# Patient Record
Sex: FEMALE | Race: WHITE | NOT HISPANIC OR LATINO | URBAN - METROPOLITAN AREA
[De-identification: names, ages, dates, MRNs, and addresses within clinical notes are randomized per-mention and may not be internally consistent; named-entity substitution may affect disease eponyms.]

---

## 2023-11-28 ENCOUNTER — EMERGENCY (EMERGENCY)
Facility: HOSPITAL | Age: 28
LOS: 1 days | Discharge: HOME | End: 2023-11-28
Attending: STUDENT IN AN ORGANIZED HEALTH CARE EDUCATION/TRAINING PROGRAM | Admitting: STUDENT IN AN ORGANIZED HEALTH CARE EDUCATION/TRAINING PROGRAM
Payer: COMMERCIAL

## 2023-11-28 ENCOUNTER — EMERGENCY (EMERGENCY)
Facility: HOSPITAL | Age: 28
LOS: 1 days | Discharge: ANOTHER HOSPITAL | End: 2023-11-28
Attending: EMERGENCY MEDICINE | Admitting: EMERGENCY MEDICINE
Payer: COMMERCIAL

## 2023-11-28 VITALS
RESPIRATION RATE: 18 BRPM | HEART RATE: 102 BPM | SYSTOLIC BLOOD PRESSURE: 129 MMHG | DIASTOLIC BLOOD PRESSURE: 74 MMHG | HEIGHT: 64 IN | WEIGHT: 134.92 LBS | OXYGEN SATURATION: 97 % | TEMPERATURE: 100 F

## 2023-11-28 VITALS
DIASTOLIC BLOOD PRESSURE: 75 MMHG | TEMPERATURE: 98 F | HEART RATE: 76 BPM | OXYGEN SATURATION: 99 % | SYSTOLIC BLOOD PRESSURE: 109 MMHG | HEIGHT: 64 IN | WEIGHT: 134.92 LBS | RESPIRATION RATE: 15 BRPM

## 2023-11-28 VITALS
DIASTOLIC BLOOD PRESSURE: 78 MMHG | OXYGEN SATURATION: 100 % | SYSTOLIC BLOOD PRESSURE: 119 MMHG | HEART RATE: 77 BPM | TEMPERATURE: 99 F | RESPIRATION RATE: 16 BRPM

## 2023-11-28 VITALS
RESPIRATION RATE: 16 BRPM | HEART RATE: 108 BPM | OXYGEN SATURATION: 97 % | TEMPERATURE: 98 F | SYSTOLIC BLOOD PRESSURE: 140 MMHG | DIASTOLIC BLOOD PRESSURE: 78 MMHG

## 2023-11-28 LAB
ALBUMIN SERPL ELPH-MCNC: 4.2 G/DL — SIGNIFICANT CHANGE UP (ref 3.4–5)
ALBUMIN SERPL ELPH-MCNC: 4.2 G/DL — SIGNIFICANT CHANGE UP (ref 3.4–5)
ALP SERPL-CCNC: 53 U/L — SIGNIFICANT CHANGE UP (ref 40–120)
ALP SERPL-CCNC: 53 U/L — SIGNIFICANT CHANGE UP (ref 40–120)
ALT FLD-CCNC: 14 U/L — SIGNIFICANT CHANGE UP (ref 12–42)
ALT FLD-CCNC: 14 U/L — SIGNIFICANT CHANGE UP (ref 12–42)
ANION GAP SERPL CALC-SCNC: 10 MMOL/L — SIGNIFICANT CHANGE UP (ref 9–16)
ANION GAP SERPL CALC-SCNC: 10 MMOL/L — SIGNIFICANT CHANGE UP (ref 9–16)
APPEARANCE UR: CLEAR — SIGNIFICANT CHANGE UP
APPEARANCE UR: CLEAR — SIGNIFICANT CHANGE UP
APTT BLD: 29.1 SEC — SIGNIFICANT CHANGE UP (ref 24.5–35.6)
APTT BLD: 29.1 SEC — SIGNIFICANT CHANGE UP (ref 24.5–35.6)
AST SERPL-CCNC: 18 U/L — SIGNIFICANT CHANGE UP (ref 15–37)
AST SERPL-CCNC: 18 U/L — SIGNIFICANT CHANGE UP (ref 15–37)
BACTERIA # UR AUTO: PRESENT /HPF — SIGNIFICANT CHANGE UP
BACTERIA # UR AUTO: PRESENT /HPF — SIGNIFICANT CHANGE UP
BASOPHILS # BLD AUTO: 0.04 K/UL — SIGNIFICANT CHANGE UP (ref 0–0.2)
BASOPHILS # BLD AUTO: 0.04 K/UL — SIGNIFICANT CHANGE UP (ref 0–0.2)
BASOPHILS NFR BLD AUTO: 0.3 % — SIGNIFICANT CHANGE UP (ref 0–2)
BASOPHILS NFR BLD AUTO: 0.3 % — SIGNIFICANT CHANGE UP (ref 0–2)
BILIRUB SERPL-MCNC: 0.4 MG/DL — SIGNIFICANT CHANGE UP (ref 0.2–1.2)
BILIRUB SERPL-MCNC: 0.4 MG/DL — SIGNIFICANT CHANGE UP (ref 0.2–1.2)
BILIRUB UR-MCNC: NEGATIVE — SIGNIFICANT CHANGE UP
BILIRUB UR-MCNC: NEGATIVE — SIGNIFICANT CHANGE UP
BLD GP AB SCN SERPL QL: NEGATIVE — SIGNIFICANT CHANGE UP
BLD GP AB SCN SERPL QL: NEGATIVE — SIGNIFICANT CHANGE UP
BUN SERPL-MCNC: 11 MG/DL — SIGNIFICANT CHANGE UP (ref 7–23)
BUN SERPL-MCNC: 11 MG/DL — SIGNIFICANT CHANGE UP (ref 7–23)
CALCIUM SERPL-MCNC: 9.5 MG/DL — SIGNIFICANT CHANGE UP (ref 8.5–10.5)
CALCIUM SERPL-MCNC: 9.5 MG/DL — SIGNIFICANT CHANGE UP (ref 8.5–10.5)
CHLORIDE SERPL-SCNC: 100 MMOL/L — SIGNIFICANT CHANGE UP (ref 96–108)
CHLORIDE SERPL-SCNC: 100 MMOL/L — SIGNIFICANT CHANGE UP (ref 96–108)
CO2 SERPL-SCNC: 24 MMOL/L — SIGNIFICANT CHANGE UP (ref 22–31)
CO2 SERPL-SCNC: 24 MMOL/L — SIGNIFICANT CHANGE UP (ref 22–31)
COLOR SPEC: YELLOW — SIGNIFICANT CHANGE UP
COLOR SPEC: YELLOW — SIGNIFICANT CHANGE UP
COMMENT - URINE: SIGNIFICANT CHANGE UP
COMMENT - URINE: SIGNIFICANT CHANGE UP
CREAT SERPL-MCNC: 0.69 MG/DL — SIGNIFICANT CHANGE UP (ref 0.5–1.3)
CREAT SERPL-MCNC: 0.69 MG/DL — SIGNIFICANT CHANGE UP (ref 0.5–1.3)
CRP SERPL-MCNC: <3 MG/L — SIGNIFICANT CHANGE UP (ref 0–4)
CRP SERPL-MCNC: <3 MG/L — SIGNIFICANT CHANGE UP (ref 0–4)
DIFF PNL FLD: NEGATIVE — SIGNIFICANT CHANGE UP
DIFF PNL FLD: NEGATIVE — SIGNIFICANT CHANGE UP
EGFR: 122 ML/MIN/1.73M2 — SIGNIFICANT CHANGE UP
EGFR: 122 ML/MIN/1.73M2 — SIGNIFICANT CHANGE UP
EOSINOPHIL # BLD AUTO: 0 K/UL — SIGNIFICANT CHANGE UP (ref 0–0.5)
EOSINOPHIL # BLD AUTO: 0 K/UL — SIGNIFICANT CHANGE UP (ref 0–0.5)
EOSINOPHIL NFR BLD AUTO: 0 % — SIGNIFICANT CHANGE UP (ref 0–6)
EOSINOPHIL NFR BLD AUTO: 0 % — SIGNIFICANT CHANGE UP (ref 0–6)
EPI CELLS # UR: PRESENT
EPI CELLS # UR: PRESENT
ERYTHROCYTE [SEDIMENTATION RATE] IN BLOOD: 8 MM/HR — SIGNIFICANT CHANGE UP
ERYTHROCYTE [SEDIMENTATION RATE] IN BLOOD: 8 MM/HR — SIGNIFICANT CHANGE UP
GLUCOSE SERPL-MCNC: 113 MG/DL — HIGH (ref 70–99)
GLUCOSE SERPL-MCNC: 113 MG/DL — HIGH (ref 70–99)
GLUCOSE UR QL: NEGATIVE MG/DL — SIGNIFICANT CHANGE UP
GLUCOSE UR QL: NEGATIVE MG/DL — SIGNIFICANT CHANGE UP
HCG SERPL-ACNC: <1 MIU/ML — SIGNIFICANT CHANGE UP
HCG SERPL-ACNC: <1 MIU/ML — SIGNIFICANT CHANGE UP
HCT VFR BLD CALC: 37.1 % — SIGNIFICANT CHANGE UP (ref 34.5–45)
HCT VFR BLD CALC: 37.1 % — SIGNIFICANT CHANGE UP (ref 34.5–45)
HGB BLD-MCNC: 12.8 G/DL — SIGNIFICANT CHANGE UP (ref 11.5–15.5)
HGB BLD-MCNC: 12.8 G/DL — SIGNIFICANT CHANGE UP (ref 11.5–15.5)
IMM GRANULOCYTES NFR BLD AUTO: 0.2 % — SIGNIFICANT CHANGE UP (ref 0–0.9)
IMM GRANULOCYTES NFR BLD AUTO: 0.2 % — SIGNIFICANT CHANGE UP (ref 0–0.9)
INR BLD: 1.13 — SIGNIFICANT CHANGE UP (ref 0.85–1.18)
INR BLD: 1.13 — SIGNIFICANT CHANGE UP (ref 0.85–1.18)
KETONES UR-MCNC: NEGATIVE MG/DL — SIGNIFICANT CHANGE UP
KETONES UR-MCNC: NEGATIVE MG/DL — SIGNIFICANT CHANGE UP
LACTATE BLDV-MCNC: 2.1 MMOL/L — HIGH (ref 0.5–2)
LACTATE BLDV-MCNC: 2.1 MMOL/L — HIGH (ref 0.5–2)
LEUKOCYTE ESTERASE UR-ACNC: ABNORMAL
LEUKOCYTE ESTERASE UR-ACNC: ABNORMAL
LYMPHOCYTES # BLD AUTO: 1.56 K/UL — SIGNIFICANT CHANGE UP (ref 1–3.3)
LYMPHOCYTES # BLD AUTO: 1.56 K/UL — SIGNIFICANT CHANGE UP (ref 1–3.3)
LYMPHOCYTES # BLD AUTO: 10.9 % — LOW (ref 13–44)
LYMPHOCYTES # BLD AUTO: 10.9 % — LOW (ref 13–44)
MCHC RBC-ENTMCNC: 30.5 PG — SIGNIFICANT CHANGE UP (ref 27–34)
MCHC RBC-ENTMCNC: 30.5 PG — SIGNIFICANT CHANGE UP (ref 27–34)
MCHC RBC-ENTMCNC: 34.5 GM/DL — SIGNIFICANT CHANGE UP (ref 32–36)
MCHC RBC-ENTMCNC: 34.5 GM/DL — SIGNIFICANT CHANGE UP (ref 32–36)
MCV RBC AUTO: 88.3 FL — SIGNIFICANT CHANGE UP (ref 80–100)
MCV RBC AUTO: 88.3 FL — SIGNIFICANT CHANGE UP (ref 80–100)
MONOCYTES # BLD AUTO: 0.53 K/UL — SIGNIFICANT CHANGE UP (ref 0–0.9)
MONOCYTES # BLD AUTO: 0.53 K/UL — SIGNIFICANT CHANGE UP (ref 0–0.9)
MONOCYTES NFR BLD AUTO: 3.7 % — SIGNIFICANT CHANGE UP (ref 2–14)
MONOCYTES NFR BLD AUTO: 3.7 % — SIGNIFICANT CHANGE UP (ref 2–14)
NEUTROPHILS # BLD AUTO: 12.11 K/UL — HIGH (ref 1.8–7.4)
NEUTROPHILS # BLD AUTO: 12.11 K/UL — HIGH (ref 1.8–7.4)
NEUTROPHILS NFR BLD AUTO: 84.9 % — HIGH (ref 43–77)
NEUTROPHILS NFR BLD AUTO: 84.9 % — HIGH (ref 43–77)
NITRITE UR-MCNC: NEGATIVE — SIGNIFICANT CHANGE UP
NITRITE UR-MCNC: NEGATIVE — SIGNIFICANT CHANGE UP
NRBC # BLD: 0 /100 WBCS — SIGNIFICANT CHANGE UP (ref 0–0)
NRBC # BLD: 0 /100 WBCS — SIGNIFICANT CHANGE UP (ref 0–0)
PH UR: 7.5 — SIGNIFICANT CHANGE UP (ref 5–8)
PH UR: 7.5 — SIGNIFICANT CHANGE UP (ref 5–8)
PLATELET # BLD AUTO: 348 K/UL — SIGNIFICANT CHANGE UP (ref 150–400)
PLATELET # BLD AUTO: 348 K/UL — SIGNIFICANT CHANGE UP (ref 150–400)
POTASSIUM SERPL-MCNC: 3.8 MMOL/L — SIGNIFICANT CHANGE UP (ref 3.5–5.3)
POTASSIUM SERPL-MCNC: 3.8 MMOL/L — SIGNIFICANT CHANGE UP (ref 3.5–5.3)
POTASSIUM SERPL-SCNC: 3.8 MMOL/L — SIGNIFICANT CHANGE UP (ref 3.5–5.3)
POTASSIUM SERPL-SCNC: 3.8 MMOL/L — SIGNIFICANT CHANGE UP (ref 3.5–5.3)
PROT SERPL-MCNC: 7.9 G/DL — SIGNIFICANT CHANGE UP (ref 6.4–8.2)
PROT SERPL-MCNC: 7.9 G/DL — SIGNIFICANT CHANGE UP (ref 6.4–8.2)
PROT UR-MCNC: NEGATIVE MG/DL — SIGNIFICANT CHANGE UP
PROT UR-MCNC: NEGATIVE MG/DL — SIGNIFICANT CHANGE UP
PROTHROM AB SERPL-ACNC: 12.8 SEC — SIGNIFICANT CHANGE UP (ref 9.5–13)
PROTHROM AB SERPL-ACNC: 12.8 SEC — SIGNIFICANT CHANGE UP (ref 9.5–13)
RAPID RVP RESULT: SIGNIFICANT CHANGE UP
RAPID RVP RESULT: SIGNIFICANT CHANGE UP
RBC # BLD: 4.2 M/UL — SIGNIFICANT CHANGE UP (ref 3.8–5.2)
RBC # BLD: 4.2 M/UL — SIGNIFICANT CHANGE UP (ref 3.8–5.2)
RBC # FLD: 11.7 % — SIGNIFICANT CHANGE UP (ref 10.3–14.5)
RBC # FLD: 11.7 % — SIGNIFICANT CHANGE UP (ref 10.3–14.5)
RBC CASTS # UR COMP ASSIST: 0 /HPF — SIGNIFICANT CHANGE UP (ref 0–4)
RBC CASTS # UR COMP ASSIST: 0 /HPF — SIGNIFICANT CHANGE UP (ref 0–4)
RH IG SCN BLD-IMP: POSITIVE — SIGNIFICANT CHANGE UP
RH IG SCN BLD-IMP: POSITIVE — SIGNIFICANT CHANGE UP
SARS-COV-2 RNA SPEC QL NAA+PROBE: SIGNIFICANT CHANGE UP
SARS-COV-2 RNA SPEC QL NAA+PROBE: SIGNIFICANT CHANGE UP
SODIUM SERPL-SCNC: 134 MMOL/L — SIGNIFICANT CHANGE UP (ref 132–145)
SODIUM SERPL-SCNC: 134 MMOL/L — SIGNIFICANT CHANGE UP (ref 132–145)
SP GR SPEC: 1.02 — SIGNIFICANT CHANGE UP (ref 1–1.03)
SP GR SPEC: 1.02 — SIGNIFICANT CHANGE UP (ref 1–1.03)
UROBILINOGEN FLD QL: 1 MG/DL — SIGNIFICANT CHANGE UP (ref 0.2–1)
UROBILINOGEN FLD QL: 1 MG/DL — SIGNIFICANT CHANGE UP (ref 0.2–1)
WBC # BLD: 14.27 K/UL — HIGH (ref 3.8–10.5)
WBC # BLD: 14.27 K/UL — HIGH (ref 3.8–10.5)
WBC # FLD AUTO: 14.27 K/UL — HIGH (ref 3.8–10.5)
WBC # FLD AUTO: 14.27 K/UL — HIGH (ref 3.8–10.5)
WBC UR QL: 1 /HPF — SIGNIFICANT CHANGE UP (ref 0–5)
WBC UR QL: 1 /HPF — SIGNIFICANT CHANGE UP (ref 0–5)

## 2023-11-28 PROCEDURE — 76830 TRANSVAGINAL US NON-OB: CPT | Mod: 26

## 2023-11-28 PROCEDURE — 71046 X-RAY EXAM CHEST 2 VIEWS: CPT

## 2023-11-28 PROCEDURE — 71046 X-RAY EXAM CHEST 2 VIEWS: CPT | Mod: 26

## 2023-11-28 PROCEDURE — 85652 RBC SED RATE AUTOMATED: CPT

## 2023-11-28 PROCEDURE — 76856 US EXAM PELVIC COMPLETE: CPT | Mod: 26

## 2023-11-28 PROCEDURE — 74177 CT ABD & PELVIS W/CONTRAST: CPT | Mod: 26

## 2023-11-28 PROCEDURE — 86901 BLOOD TYPING SEROLOGIC RH(D): CPT

## 2023-11-28 PROCEDURE — 85730 THROMBOPLASTIN TIME PARTIAL: CPT

## 2023-11-28 PROCEDURE — 99285 EMERGENCY DEPT VISIT HI MDM: CPT

## 2023-11-28 PROCEDURE — 99284 EMERGENCY DEPT VISIT MOD MDM: CPT | Mod: 25

## 2023-11-28 PROCEDURE — 85610 PROTHROMBIN TIME: CPT

## 2023-11-28 PROCEDURE — 86140 C-REACTIVE PROTEIN: CPT

## 2023-11-28 PROCEDURE — 86850 RBC ANTIBODY SCREEN: CPT

## 2023-11-28 PROCEDURE — 87086 URINE CULTURE/COLONY COUNT: CPT

## 2023-11-28 PROCEDURE — 0225U NFCT DS DNA&RNA 21 SARSCOV2: CPT

## 2023-11-28 PROCEDURE — 87040 BLOOD CULTURE FOR BACTERIA: CPT

## 2023-11-28 PROCEDURE — 86900 BLOOD TYPING SEROLOGIC ABO: CPT

## 2023-11-28 PROCEDURE — 96374 THER/PROPH/DIAG INJ IV PUSH: CPT

## 2023-11-28 PROCEDURE — 36415 COLL VENOUS BLD VENIPUNCTURE: CPT

## 2023-11-28 RX ORDER — KETOROLAC TROMETHAMINE 30 MG/ML
15 SYRINGE (ML) INJECTION ONCE
Refills: 0 | Status: DISCONTINUED | OUTPATIENT
Start: 2023-11-28 | End: 2023-11-28

## 2023-11-28 RX ORDER — MORPHINE SULFATE 50 MG/1
2 CAPSULE, EXTENDED RELEASE ORAL ONCE
Refills: 0 | Status: DISCONTINUED | OUTPATIENT
Start: 2023-11-28 | End: 2023-11-28

## 2023-11-28 RX ORDER — SODIUM CHLORIDE 9 MG/ML
1000 INJECTION INTRAMUSCULAR; INTRAVENOUS; SUBCUTANEOUS ONCE
Refills: 0 | Status: COMPLETED | OUTPATIENT
Start: 2023-11-28 | End: 2023-11-28

## 2023-11-28 RX ADMIN — SODIUM CHLORIDE 1000 MILLILITER(S): 9 INJECTION INTRAMUSCULAR; INTRAVENOUS; SUBCUTANEOUS at 13:38

## 2023-11-28 RX ADMIN — MORPHINE SULFATE 2 MILLIGRAM(S): 50 CAPSULE, EXTENDED RELEASE ORAL at 13:52

## 2023-11-28 RX ADMIN — SODIUM CHLORIDE 1000 MILLILITER(S): 9 INJECTION INTRAMUSCULAR; INTRAVENOUS; SUBCUTANEOUS at 18:27

## 2023-11-28 RX ADMIN — MORPHINE SULFATE 2 MILLIGRAM(S): 50 CAPSULE, EXTENDED RELEASE ORAL at 16:03

## 2023-11-28 RX ADMIN — Medication 15 MILLIGRAM(S): at 18:27

## 2023-11-28 NOTE — ED ADULT NURSE NOTE - CAS TRG GENERAL AIRWAY, MLM
Bactrim Counseling:  I discussed with the patient the risks of sulfa antibiotics including but not limited to GI upset, allergic reaction, drug rash, diarrhea, dizziness, photosensitivity, and yeast infections.  Rarely, more serious reactions can occur including but not limited to aplastic anemia, agranulocytosis, methemoglobinemia, blood dyscrasias, liver or kidney failure, lung infiltrates or desquamative/blistering drug rashes. Patent

## 2023-11-28 NOTE — ED PROVIDER NOTE - NSFOLLOWUPINSTRUCTIONS_ED_ALL_ED_FT
OVARIAN CYST - Your workup today has revealed an OVARIAN CYST. Your results are on the pages to follow. This is not completely normal, but is a common finding, and while these cysts can commonly cause discomfort if they change in size or burst, it is usually not life threatening and can be managed safely in the outpatient setting. Regardless, it can be very uncomfortable and requires pain control regularly until the symptoms have resolved. See below for recommendations...    FOLLOW UP - Follow up with GYNECOLOGY within one week.    RETURN PRECAUTIONS - Return to the Emergency Department for persistent, worsening, or new symptoms including worsening abdominal pain not controlled by medication suggested, vaginal bleeding more than 4 pads soaked per hour, fever > 102, blood in urine, or any other serious concerns.

## 2023-11-28 NOTE — ED ADULT NURSE NOTE - OBJECTIVE STATEMENT
pt c/o RLQ abd pain starting today, denies tenderness, denies N/V/D, denies fevers/chills. a+ox4, resp even and unlabored, steady gait. of note pt has hx of PCOS.

## 2023-11-28 NOTE — ED ADULT NURSE NOTE - OBJECTIVE STATEMENT
27 F / Hx pf PCOS bibems to ED as transferred from [facility] for ovarian cysts eval.   PTA patient received Morphine IV . Noted left 20 G, denies  sob/cp / n/v / fevers . Patient  reports feel better  after the Morphine injection. Patient's  last meal was this morning .

## 2023-11-28 NOTE — ED PROVIDER NOTE - PHYSICAL EXAMINATION
CONSTITUTIONAL: Awake, alert.  Nontoxic, no acute distress.    HEAD: Normocephalic, atraumatic.    EYES: Conjunctivae clear without exudates or hemorrhage. Sclera is non-icteric.    ENT: Normal appearing external ears, nose, mucous membranes moist.    NECK: supple, trachea midline.    HEART:  Normal rate, regular rhythm.  Heart sounds S1, S2.  No murmurs, rubs or gallops.    LUNGS:  No acute respiratory distress.  Non-tachypneic and non-labored.  Lungs are clear bilaterally with good aeration.  No wheezing, rales, rhonchi.    ABDOMEN: Normal appearing skin without lesions, rashes.  Normal bowel sounds x 4.  Soft, non-distended, +ttp LLQ. No rebound or guarding. No hernias or masses palpable.  No pulsatile abdominal mass.   No CVA tenderness b/l.    MUSCULOSKELETAL:  Moving all extremities without issue.    SKIN: Skin in warm, dry and intact without rashes or lesions.  Appropriate color for ethnicity.    NEUROLOGICAL:  Patient is alert, oriented x person, place and time.    PSYCH: Appropriate mood and affect. Good judgment and insight.

## 2023-11-28 NOTE — ED ADULT NURSE NOTE - NS_NURSE_DISC_TEACHING_YN_ED_ALL_ED
Yes
Photo Preface (Leave Blank If You Do Not Want): Photographs were obtained today
Detail Level: Zone

## 2023-11-28 NOTE — ED PROVIDER NOTE - OBJECTIVE STATEMENT
27-year-old female, history of PCOS, presents this emergency room with left-sided pelvic pain and lower quadrant pain that presented to urgent care earlier today.  States that she woke up with the pain.  Denies any vaginal bleeding or discharge.  Has not tried any medications for symptomatically.  Went to urgent care and was sent here for advanced imaging due to pain.  Received Toradol at urgent care which offered no relief of symptoms.  Denies past similar episodes.  Does not take any birth control.

## 2023-11-28 NOTE — ED PROVIDER NOTE - CLINICAL SUMMARY MEDICAL DECISION MAKING FREE TEXT BOX
Patient presents for left lower quadrant pelvic pain  Vital signs are stable and physical exam is grossly unremarkable  However patient states that her pain is a 10 out of 10, and Toradol had no effect  Labs, hCG, urinalysis, CTAP and ultrasound ordered  Will continue to monitor patient

## 2023-11-28 NOTE — CONSULT NOTE ADULT - SUBJECTIVE AND OBJECTIVE BOX
26yo G0 LMP 10/30 presenting as transfer from [facility] for r/o ovarian torsion. The patient initially presenting to urgent care after sudden onset of LLQ pain. She reports pain as 10/10 at that time and was instructed to go to ED. She presented to [facility], where CT A/P demonstrated 8cm abdominal cyst. She received Morphine for analgesia and TVUS confirmed presence of cyst. CBC demonstrated elevated WBC count and labs were otherwise wnl. She was then transferred to [facility] ED for further evaluation. At [facility], the patient reports 7/10 pain after receiving Toradol. She states that the pain is constant, sharp, crampy, and radiates to lower back. Reports that pressure on LLQ does not increase pain. She denies nausea, vomiting, CP, SOB, diarrhea, constipation, HA, lightheadedness, syncope, fever, chills, or dysuria.     OB/GYN Hx: G0; denies hx of fibroids, stds, abnormal pap smears. In monogamous relationship with fiance. Previously on OCPs, d/c'd 1.5yrs ago. Using withdrawal method.   PMHx: PCOS  SHx: Denies  Meds: Spironolactone 150mg qd (for acne)  Allergies: Strawberries; NKDA    PHYSICAL EXAM:   Vital Signs Last 24 Hrs  T(C): 37.2 (2023 21:56), Max: 38 (2023 18:02)  T(F): 98.9 (2023 21:56), Max: 100.4 (2023 18:02)  HR: 100 (2023 21:56) (76 - 108)  BP: 123/84 (2023 21:56) (109/75 - 140/78)  RR: 20 (2023 21:56) (15 - 20)  SpO2: 97% (2023 21:56) (97% - 99%)    Parameters below as of 2023 21:56  Patient On (Oxygen Delivery Method): room air    **************************  Constitutional: NAD  Respiratory: breathing well on RA  Cardiovascular: RRR  Gastrointestinal: soft, non tender, positive bowel sounds, no rebound or guarding   Pelvic exam: SSE completed. Cervix appears closed, no discharge, dark brown blood in posterior vaginal vault. No CMT or adnexal tenderness.   Extremities: no calf tenderness or swelling    LABS:                        12.8   14.27 )-----------( 348      ( 2023 12:31 )             37.1         134  |  100  |  11  ----------------------------<  113<H>  3.8   |  24  |  0.69    Ca    9.5      2023 12:31    TPro  7.9  /  Alb  4.2  /  TBili  0.4  /  DBili  x   /  AST  18  /  ALT  14  /  AlkPhos  53      PT/INR - ( 2023 20:29 )   PT: 12.8 sec;   INR: 1.13          PTT - ( 2023 20:29 )  PTT:29.1 sec  Urinalysis Basic - ( 2023 12:31 )    Color: Yellow / Appearance: Clear / S.022 / pH: x  Gluc: 113 mg/dL / Ketone: Negative mg/dL  / Bili: Negative / Urobili: 1.0 mg/dL   Blood: x / Protein: Negative mg/dL / Nitrite: Negative   Leuk Esterase: Trace / RBC: 0 /HPF / WBC 1 /HPF   Sq Epi: x / Non Sq Epi: x / Bacteria: Present /HPF      HCG Quantitative, Serum: <1 mIU/mL ( @ 12:31)      RADIOLOGY & ADDITIONAL STUDIES:     ACC: [id number] EXAM:  CT ABDOMEN AND PELVIS IC   ORDERED BY: NIKUNJ PIMENTEL     PROCEDURE DATE:  2023          INTERPRETATION:  CLINICAL INFORMATION: Abdominal pain    COMPARISON: Pelvic ultrasound 2023    CONTRAST/COMPLICATIONS:  IV Contrast: Omnipaque 350  96 cc administered   4 cc discarded  Oral Contrast: NONE  Complications: None reported at time of study completion    PROCEDURE:  CT of the Abdomen and Pelvis was performed.  Sagittal and coronal reformats were performed.    FINDINGS:  LOWER CHEST: Within normal limits.    LIVER: Within normal limits.  BILE DUCTS: Normal caliber.  GALLBLADDER: Within normal limits.  SPLEEN: Within normal limits.  PANCREAS: Within normal limits.  ADRENALS: Within normal limits.  KIDNEYS/URETERS: Within normal limits.    BLADDER: Within normal limits.  REPRODUCTIVE ORGANS: 8 cm left ovarian cyst.    BOWEL: No bowel obstruction. The entire length of the appendix is not   identified with certainty but visualized portion of the appendix is   within normal limits.  PERITONEUM: No ascites.  VESSELS: Within normal limits.  RETROPERITONEUM/LYMPH NODES: No lymphadenopathy.  ABDOMINAL WALL: Within normal limits.  BONES: Within normal limits.    IMPRESSION:  No evidence of appendicitis. No bowel obstruction or free air. 8 cm left   ovarian cyst.        --- End of Report ---          VERONICA MANDUJANO MD; Resident Radiologist  This document has been electronically signed.  MATHEUS DEAN MD; Attending Radiologist  This document has been electronically signed. 2023  3:32PM      ACC: [id number] EXAM:  US TRANSVAGINAL   ORDERED BY: NIKUNJ PIMENTEL     ACC: [id number] EXAM:  US PELVIC COMPLETE   ORDERED BY: NIKUNJ PIMENTEL     PROCEDURE DATE:  2023          INTERPRETATION:  CLINICAL INFORMATION: Left pelvic pain. History of   polycystic ovarian syndrome.    LMP: 2023    COMPARISON: None available.    TECHNIQUE:  Endovaginal and transabdominal pelvic sonogram. Color and Spectral   Doppler was performed.    FINDINGS:  Uterus: 3.3 x 1.8 x 2.5 cm. Within normal limits.  Endometrium: 0.6 cm. Within normal limits.    Right ovary: 3.3 x 1.8 x 2.5 cm. Within normal limits.  Left ovary: 7.9 x 4.6 x 7.0 cm (934 cc), inclusive of simple appearing   cyst 6.4 x 4.3 x 5.8 cm. Normal arterial and venous waveforms.    Fluid: None.    IMPRESSION:  Large left ovarian cyst. No evidence of torsion at this time. Correlate   clinically for intermittent torsion.        --- End of Report ---            TRINITY CARDONA MD; Attending Radiologist  This document has been electronically signed. 2023  1:50PM     28yo G0 LMP 10/30 presenting as transfer from [facility] for r/o ovarian torsion. The patient initially presenting to urgent care after sudden onset of LLQ pain. She reports pain as 10/10 at that time and was instructed to go to ED. She presented to [facility], where CT A/P demonstrated 8cm abdominal cyst. She received Morphine for analgesia and TVUS confirmed presence of cyst. CBC demonstrated elevated WBC count and labs were otherwise wnl. She was then transferred to [facility] ED for further evaluation. At [facility], the patient reports 7/10 pain after receiving Toradol. She states that the pain is constant, sharp, crampy, and radiates to lower back. Reports that pressure on LLQ does not increase pain. She denies nausea, vomiting, CP, SOB, diarrhea, constipation, HA, lightheadedness, syncope, fever, chills, or dysuria.     OB/GYN Hx: G0; denies hx of fibroids, stds, abnormal pap smears. In monogamous relationship with fiance. Previously on OCPs, d/c'd 1.5yrs ago. Using withdrawal method.   PMHx: PCOS  SHx: Denies  Meds: Spironolactone 150mg qd (for acne)  Allergies: Strawberries; NKDA    PHYSICAL EXAM:   Vital Signs Last 24 Hrs  T(C): 37.2 (2023 21:56), Max: 38 (2023 18:02)  T(F): 98.9 (2023 21:56), Max: 100.4 (2023 18:02)  HR: 100 (2023 21:56) (76 - 108)  BP: 123/84 (2023 21:56) (109/75 - 140/78)  RR: 20 (2023 21:56) (15 - 20)  SpO2: 97% (2023 21:56) (97% - 99%)    Parameters below as of 2023 21:56  Patient On (Oxygen Delivery Method): room air    **************************  Constitutional: NAD  Respiratory: breathing well on RA  Cardiovascular: RRR  Gastrointestinal: soft, non tender, positive bowel sounds, no rebound or guarding   Pelvic exam: SSE completed. Cervix appears closed, no discharge, dark brown blood in posterior vaginal vault. No CMT or adnexal tenderness.   Extremities: no calf tenderness or swelling    LABS:                        12.8   14.27 )-----------( 348      ( 2023 12:31 )             37.1         134  |  100  |  11  ----------------------------<  113<H>  3.8   |  24  |  0.69    Ca    9.5      2023 12:31    TPro  7.9  /  Alb  4.2  /  TBili  0.4  /  DBili  x   /  AST  18  /  ALT  14  /  AlkPhos  53      PT/INR - ( 2023 20:29 )   PT: 12.8 sec;   INR: 1.13          PTT - ( 2023 20:29 )  PTT:29.1 sec  Urinalysis Basic - ( 2023 12:31 )    Color: Yellow / Appearance: Clear / S.022 / pH: x  Gluc: 113 mg/dL / Ketone: Negative mg/dL  / Bili: Negative / Urobili: 1.0 mg/dL   Blood: x / Protein: Negative mg/dL / Nitrite: Negative   Leuk Esterase: Trace / RBC: 0 /HPF / WBC 1 /HPF   Sq Epi: x / Non Sq Epi: x / Bacteria: Present /HPF      HCG Quantitative, Serum: <1 mIU/mL ( @ 12:31)      RADIOLOGY & ADDITIONAL STUDIES:     ACC: [id number] EXAM:  CT ABDOMEN AND PELVIS IC   ORDERED BY: NIKUNJ PIMENTEL     PROCEDURE DATE:  2023          INTERPRETATION:  CLINICAL INFORMATION: Abdominal pain    COMPARISON: Pelvic ultrasound 2023    CONTRAST/COMPLICATIONS:  IV Contrast: Omnipaque 350  96 cc administered   4 cc discarded  Oral Contrast: NONE  Complications: None reported at time of study completion    PROCEDURE:  CT of the Abdomen and Pelvis was performed.  Sagittal and coronal reformats were performed.    FINDINGS:  LOWER CHEST: Within normal limits.    LIVER: Within normal limits.  BILE DUCTS: Normal caliber.  GALLBLADDER: Within normal limits.  SPLEEN: Within normal limits.  PANCREAS: Within normal limits.  ADRENALS: Within normal limits.  KIDNEYS/URETERS: Within normal limits.    BLADDER: Within normal limits.  REPRODUCTIVE ORGANS: 8 cm left ovarian cyst.    BOWEL: No bowel obstruction. The entire length of the appendix is not   identified with certainty but visualized portion of the appendix is   within normal limits.  PERITONEUM: No ascites.  VESSELS: Within normal limits.  RETROPERITONEUM/LYMPH NODES: No lymphadenopathy.  ABDOMINAL WALL: Within normal limits.  BONES: Within normal limits.    IMPRESSION:  No evidence of appendicitis. No bowel obstruction or free air. 8 cm left   ovarian cyst.        --- End of Report ---          VERONICA MANDUJANO MD; Resident Radiologist  This document has been electronically signed.  MATHEUS DEAN MD; Attending Radiologist  This document has been electronically signed. 2023  3:32PM      ACC: [id number] EXAM:  US TRANSVAGINAL   ORDERED BY: NIKUNJ PIMENTEL     ACC: [id number] EXAM:  US PELVIC COMPLETE   ORDERED BY: NIKUNJ PIMENTEL     PROCEDURE DATE:  2023          INTERPRETATION:  CLINICAL INFORMATION: Left pelvic pain. History of   polycystic ovarian syndrome.    LMP: 2023    COMPARISON: None available.    TECHNIQUE:  Endovaginal and transabdominal pelvic sonogram. Color and Spectral   Doppler was performed.    FINDINGS:  Uterus: 3.3 x 1.8 x 2.5 cm. Within normal limits.  Endometrium: 0.6 cm. Within normal limits.    Right ovary: 3.3 x 1.8 x 2.5 cm. Within normal limits.  Left ovary: 7.9 x 4.6 x 7.0 cm (934 cc), inclusive of simple appearing   cyst 6.4 x 4.3 x 5.8 cm. Normal arterial and venous waveforms.    Fluid: None.    IMPRESSION:  Large left ovarian cyst. No evidence of torsion at this time. Correlate   clinically for intermittent torsion.        --- End of Report ---            TRINITY CARDONA MD; Attending Radiologist  This document has been electronically signed. 2023  1:50PM     26yo G0 LMP 10/30 presenting as transfer from [facility] for r/o ovarian torsion. The patient initially presenting to urgent care after sudden onset of LLQ pain. She reports pain as 10/10 at that time and was instructed to go to ED. She presented to [facility], where CT A/P demonstrated 8cm abdominal cyst. She received Morphine for analgesia and TVUS confirmed presence of cyst. CBC demonstrated elevated WBC count and labs were otherwise wnl. She was then transferred to [facility] ED for further evaluation. At [facility], the patient reports 7/10 pain after receiving Toradol. She states that the pain is constant, sharp, crampy, and radiates to lower back. Reports that pressure on LLQ does not increase pain. She denies nausea, vomiting, CP, SOB, diarrhea, constipation, HA, lightheadedness, syncope, fever, chills, or dysuria.     OB/GYN Hx: G0; denies hx of fibroids, stds, abnormal pap smears. In monogamous relationship with fiance. Previously on OCPs, d/c'd 1.5yrs ago. Using withdrawal method.   PMHx: PCOS  SHx: Denies  Meds: Spironolactone 150mg qd (for acne)  Allergies: Strawberries; NKDA    PHYSICAL EXAM:   Vital Signs Last 24 Hrs  T(C): 37.2 (2023 21:56), Max: 38 (2023 18:02)  T(F): 98.9 (2023 21:56), Max: 100.4 (2023 18:02)  HR: 100 (2023 21:56) (76 - 108)  BP: 123/84 (2023 21:56) (109/75 - 140/78)  RR: 20 (2023 21:56) (15 - 20)  SpO2: 97% (2023 21:56) (97% - 99%)    Parameters below as of 2023 21:56  Patient On (Oxygen Delivery Method): room air    **************************  Constitutional: NAD  Respiratory: breathing well on RA  Cardiovascular: RRR  Gastrointestinal: soft, non tender, positive bowel sounds, no rebound or guarding   Pelvic exam: SSE completed. Cervix appears closed, no discharge, dark brown blood in posterior vaginal vault. No CMT or adnexal tenderness.   Extremities: no calf tenderness or swelling    LABS:                        12.8   14.27 )-----------( 348      ( 2023 12:31 )             37.1         134  |  100  |  11  ----------------------------<  113<H>  3.8   |  24  |  0.69    Ca    9.5      2023 12:31    TPro  7.9  /  Alb  4.2  /  TBili  0.4  /  DBili  x   /  AST  18  /  ALT  14  /  AlkPhos  53      PT/INR - ( 2023 20:29 )   PT: 12.8 sec;   INR: 1.13          PTT - ( 2023 20:29 )  PTT:29.1 sec  Urinalysis Basic - ( 2023 12:31 )    Color: Yellow / Appearance: Clear / S.022 / pH: x  Gluc: 113 mg/dL / Ketone: Negative mg/dL  / Bili: Negative / Urobili: 1.0 mg/dL   Blood: x / Protein: Negative mg/dL / Nitrite: Negative   Leuk Esterase: Trace / RBC: 0 /HPF / WBC 1 /HPF   Sq Epi: x / Non Sq Epi: x / Bacteria: Present /HPF      HCG Quantitative, Serum: <1 mIU/mL ( @ 12:31)      RADIOLOGY & ADDITIONAL STUDIES:     ACC: [id number] EXAM:  CT ABDOMEN AND PELVIS IC   ORDERED BY: NIKUNJ PIMENTEL     PROCEDURE DATE:  2023          INTERPRETATION:  CLINICAL INFORMATION: Abdominal pain    COMPARISON: Pelvic ultrasound 2023    CONTRAST/COMPLICATIONS:  IV Contrast: Omnipaque 350  96 cc administered   4 cc discarded  Oral Contrast: NONE  Complications: None reported at time of study completion    PROCEDURE:  CT of the Abdomen and Pelvis was performed.  Sagittal and coronal reformats were performed.    FINDINGS:  LOWER CHEST: Within normal limits.    LIVER: Within normal limits.  BILE DUCTS: Normal caliber.  GALLBLADDER: Within normal limits.  SPLEEN: Within normal limits.  PANCREAS: Within normal limits.  ADRENALS: Within normal limits.  KIDNEYS/URETERS: Within normal limits.    BLADDER: Within normal limits.  REPRODUCTIVE ORGANS: 8 cm left ovarian cyst.    BOWEL: No bowel obstruction. The entire length of the appendix is not   identified with certainty but visualized portion of the appendix is   within normal limits.  PERITONEUM: No ascites.  VESSELS: Within normal limits.  RETROPERITONEUM/LYMPH NODES: No lymphadenopathy.  ABDOMINAL WALL: Within normal limits.  BONES: Within normal limits.    IMPRESSION:  No evidence of appendicitis. No bowel obstruction or free air. 8 cm left   ovarian cyst.        --- End of Report ---          VERONICA MANDUJANO MD; Resident Radiologist  This document has been electronically signed.  MATHEUS DEAN MD; Attending Radiologist  This document has been electronically signed. 2023  3:32PM      ACC: [id number] EXAM:  US TRANSVAGINAL   ORDERED BY: NIKUNJ PIMENTEL     ACC: [id number] EXAM:  US PELVIC COMPLETE   ORDERED BY: NIKUNJ PIMENTEL     PROCEDURE DATE:  2023          INTERPRETATION:  CLINICAL INFORMATION: Left pelvic pain. History of   polycystic ovarian syndrome.    LMP: 2023    COMPARISON: None available.    TECHNIQUE:  Endovaginal and transabdominal pelvic sonogram. Color and Spectral   Doppler was performed.    FINDINGS:  Uterus: 3.3 x 1.8 x 2.5 cm. Within normal limits.  Endometrium: 0.6 cm. Within normal limits.    Right ovary: 3.3 x 1.8 x 2.5 cm. Within normal limits.  Left ovary: 7.9 x 4.6 x 7.0 cm (934 cc), inclusive of simple appearing   cyst 6.4 x 4.3 x 5.8 cm. Normal arterial and venous waveforms.    Fluid: None.    IMPRESSION:  Large left ovarian cyst. No evidence of torsion at this time. Correlate   clinically for intermittent torsion.        --- End of Report ---            TRINITY CARDONA MD; Attending Radiologist  This document has been electronically signed. 2023  1:50PM     26yo G0 LMP 10/30 presenting as transfer from [facility] for r/o ovarian torsion. The patient initially presenting to urgent care after sudden onset of LLQ pain. She reports pain as 10/10 at that time and was instructed to go to ED. She presented to [facility], where CT A/P demonstrated 8cm abdominal cyst. She received Morphine for analgesia and TVUS confirmed presence of cyst. CBC demonstrated elevated WBC count and labs were otherwise wnl. She was then transferred to [facility] ED for further evaluation. At [facility], the patient reports 7/10 pain after receiving Toradol. She states that the pain is constant, sharp, crampy, and radiates to lower back. Reports that pressure on LLQ does not increase pain. She denies nausea, vomiting, CP, SOB, diarrhea, constipation, HA, lightheadedness, syncope, fever, chills, or dysuria.     OB/GYN Hx: G0; denies hx of fibroids, stds, abnormal pap smears. In monogamous relationship with fiance. Previously on OCPs, d/c'd 1.5yrs ago. Using withdrawal method.   PMHx: PCOS  SHx: Denies  Meds: Spironolactone 150mg qd (for acne)  Allergies: Strawberries; NKDA    PHYSICAL EXAM:   Vital Signs Last 24 Hrs  T(C): 37.2 (2023 21:56), Max: 38 (2023 18:02)  T(F): 98.9 (2023 21:56), Max: 100.4 (2023 18:02)  HR: 100 (2023 21:56) (76 - 108)  BP: 123/84 (2023 21:56) (109/75 - 140/78)  RR: 20 (2023 21:56) (15 - 20)  SpO2: 97% (2023 21:56) (97% - 99%)    Parameters below as of 2023 21:56  Patient On (Oxygen Delivery Method): room air    **************************  Constitutional: NAD  Respiratory: breathing well on RA  Cardiovascular: RRR  Gastrointestinal: soft, non tender, positive bowel sounds, no rebound or guarding   Pelvic exam: SSE completed. Cervix appears closed, no discharge, 5cc dark brown blood in posterior vaginal vault. No CMT or adnexal tenderness.   Extremities: no calf tenderness or swelling    LABS:                        12.8   14.27 )-----------( 348      ( 2023 12:31 )             37.1         134  |  100  |  11  ----------------------------<  113<H>  3.8   |  24  |  0.69    Ca    9.5      2023 12:31    TPro  7.9  /  Alb  4.2  /  TBili  0.4  /  DBili  x   /  AST  18  /  ALT  14  /  AlkPhos  53      PT/INR - ( 2023 20:29 )   PT: 12.8 sec;   INR: 1.13          PTT - ( 2023 20:29 )  PTT:29.1 sec  Urinalysis Basic - ( 2023 12:31 )    Color: Yellow / Appearance: Clear / S.022 / pH: x  Gluc: 113 mg/dL / Ketone: Negative mg/dL  / Bili: Negative / Urobili: 1.0 mg/dL   Blood: x / Protein: Negative mg/dL / Nitrite: Negative   Leuk Esterase: Trace / RBC: 0 /HPF / WBC 1 /HPF   Sq Epi: x / Non Sq Epi: x / Bacteria: Present /HPF      HCG Quantitative, Serum: <1 mIU/mL ( @ 12:31)      RADIOLOGY & ADDITIONAL STUDIES:     ACC: [id number] EXAM:  CT ABDOMEN AND PELVIS IC   ORDERED BY: NIKUNJ PIMENTEL     PROCEDURE DATE:  2023          INTERPRETATION:  CLINICAL INFORMATION: Abdominal pain    COMPARISON: Pelvic ultrasound 2023    CONTRAST/COMPLICATIONS:  IV Contrast: Omnipaque 350  96 cc administered   4 cc discarded  Oral Contrast: NONE  Complications: None reported at time of study completion    PROCEDURE:  CT of the Abdomen and Pelvis was performed.  Sagittal and coronal reformats were performed.    FINDINGS:  LOWER CHEST: Within normal limits.    LIVER: Within normal limits.  BILE DUCTS: Normal caliber.  GALLBLADDER: Within normal limits.  SPLEEN: Within normal limits.  PANCREAS: Within normal limits.  ADRENALS: Within normal limits.  KIDNEYS/URETERS: Within normal limits.    BLADDER: Within normal limits.  REPRODUCTIVE ORGANS: 8 cm left ovarian cyst.    BOWEL: No bowel obstruction. The entire length of the appendix is not   identified with certainty but visualized portion of the appendix is   within normal limits.  PERITONEUM: No ascites.  VESSELS: Within normal limits.  RETROPERITONEUM/LYMPH NODES: No lymphadenopathy.  ABDOMINAL WALL: Within normal limits.  BONES: Within normal limits.    IMPRESSION:  No evidence of appendicitis. No bowel obstruction or free air. 8 cm left   ovarian cyst.        --- End of Report ---          VERONICA MANDUJANO MD; Resident Radiologist  This document has been electronically signed.  MATHEUS DEAN MD; Attending Radiologist  This document has been electronically signed. 2023  3:32PM      ACC: [id number] EXAM:  US TRANSVAGINAL   ORDERED BY: NIKUNJ PIMENTEL     ACC: [id number] EXAM:  US PELVIC COMPLETE   ORDERED BY: NIKUNJ PIMENTEL     PROCEDURE DATE:  2023          INTERPRETATION:  CLINICAL INFORMATION: Left pelvic pain. History of   polycystic ovarian syndrome.    LMP: 2023    COMPARISON: None available.    TECHNIQUE:  Endovaginal and transabdominal pelvic sonogram. Color and Spectral   Doppler was performed.    FINDINGS:  Uterus: 3.3 x 1.8 x 2.5 cm. Within normal limits.  Endometrium: 0.6 cm. Within normal limits.    Right ovary: 3.3 x 1.8 x 2.5 cm. Within normal limits.  Left ovary: 7.9 x 4.6 x 7.0 cm (934 cc), inclusive of simple appearing   cyst 6.4 x 4.3 x 5.8 cm. Normal arterial and venous waveforms.    Fluid: None.    IMPRESSION:  Large left ovarian cyst. No evidence of torsion at this time. Correlate   clinically for intermittent torsion.        --- End of Report ---            TRINITY CARDONA MD; Attending Radiologist  This document has been electronically signed. 2023  1:50PM     28yo G0 LMP 10/30 presenting as transfer from [facility] for r/o ovarian torsion. The patient initially presenting to urgent care after sudden onset of LLQ pain. She reports pain as 10/10 at that time and was instructed to go to ED. She presented to [facility], where CT A/P demonstrated 8cm abdominal cyst. She received Morphine for analgesia and TVUS confirmed presence of cyst. CBC demonstrated elevated WBC count and labs were otherwise wnl. She was then transferred to [facility] ED for further evaluation. At [facility], the patient reports 7/10 pain after receiving Toradol. She states that the pain is constant, sharp, crampy, and radiates to lower back. Reports that pressure on LLQ does not increase pain. She denies nausea, vomiting, CP, SOB, diarrhea, constipation, HA, lightheadedness, syncope, fever, chills, or dysuria.     OB/GYN Hx: G0; denies hx of fibroids, stds, abnormal pap smears. In monogamous relationship with fiance. Previously on OCPs, d/c'd 1.5yrs ago. Using withdrawal method.   PMHx: PCOS  SHx: Denies  Meds: Spironolactone 150mg qd (for acne)  Allergies: Strawberries; NKDA    PHYSICAL EXAM:   Vital Signs Last 24 Hrs  T(C): 37.2 (2023 21:56), Max: 38 (2023 18:02)  T(F): 98.9 (2023 21:56), Max: 100.4 (2023 18:02)  HR: 100 (2023 21:56) (76 - 108)  BP: 123/84 (2023 21:56) (109/75 - 140/78)  RR: 20 (2023 21:56) (15 - 20)  SpO2: 97% (2023 21:56) (97% - 99%)    Parameters below as of 2023 21:56  Patient On (Oxygen Delivery Method): room air    **************************  Constitutional: NAD  Respiratory: breathing well on RA  Cardiovascular: RRR  Gastrointestinal: soft, non tender, positive bowel sounds, no rebound or guarding   Pelvic exam: SSE completed. Cervix appears closed, no discharge, 5cc dark brown blood in posterior vaginal vault. No CMT or adnexal tenderness.   Extremities: no calf tenderness or swelling    LABS:                        12.8   14.27 )-----------( 348      ( 2023 12:31 )             37.1         134  |  100  |  11  ----------------------------<  113<H>  3.8   |  24  |  0.69    Ca    9.5      2023 12:31    TPro  7.9  /  Alb  4.2  /  TBili  0.4  /  DBili  x   /  AST  18  /  ALT  14  /  AlkPhos  53      PT/INR - ( 2023 20:29 )   PT: 12.8 sec;   INR: 1.13          PTT - ( 2023 20:29 )  PTT:29.1 sec  Urinalysis Basic - ( 2023 12:31 )    Color: Yellow / Appearance: Clear / S.022 / pH: x  Gluc: 113 mg/dL / Ketone: Negative mg/dL  / Bili: Negative / Urobili: 1.0 mg/dL   Blood: x / Protein: Negative mg/dL / Nitrite: Negative   Leuk Esterase: Trace / RBC: 0 /HPF / WBC 1 /HPF   Sq Epi: x / Non Sq Epi: x / Bacteria: Present /HPF      HCG Quantitative, Serum: <1 mIU/mL ( @ 12:31)      RADIOLOGY & ADDITIONAL STUDIES:     ACC: [id number] EXAM:  CT ABDOMEN AND PELVIS IC   ORDERED BY: NIKUNJ PIMENTEL     PROCEDURE DATE:  2023          INTERPRETATION:  CLINICAL INFORMATION: Abdominal pain    COMPARISON: Pelvic ultrasound 2023    CONTRAST/COMPLICATIONS:  IV Contrast: Omnipaque 350  96 cc administered   4 cc discarded  Oral Contrast: NONE  Complications: None reported at time of study completion    PROCEDURE:  CT of the Abdomen and Pelvis was performed.  Sagittal and coronal reformats were performed.    FINDINGS:  LOWER CHEST: Within normal limits.    LIVER: Within normal limits.  BILE DUCTS: Normal caliber.  GALLBLADDER: Within normal limits.  SPLEEN: Within normal limits.  PANCREAS: Within normal limits.  ADRENALS: Within normal limits.  KIDNEYS/URETERS: Within normal limits.    BLADDER: Within normal limits.  REPRODUCTIVE ORGANS: 8 cm left ovarian cyst.    BOWEL: No bowel obstruction. The entire length of the appendix is not   identified with certainty but visualized portion of the appendix is   within normal limits.  PERITONEUM: No ascites.  VESSELS: Within normal limits.  RETROPERITONEUM/LYMPH NODES: No lymphadenopathy.  ABDOMINAL WALL: Within normal limits.  BONES: Within normal limits.    IMPRESSION:  No evidence of appendicitis. No bowel obstruction or free air. 8 cm left   ovarian cyst.        --- End of Report ---          VERONICA MANDUJANO MD; Resident Radiologist  This document has been electronically signed.  MATHEUS DEAN MD; Attending Radiologist  This document has been electronically signed. 2023  3:32PM      ACC: [id number] EXAM:  US TRANSVAGINAL   ORDERED BY: NIKUNJ PIMENTEL     ACC: [id number] EXAM:  US PELVIC COMPLETE   ORDERED BY: NIKUNJ PIMENTEL     PROCEDURE DATE:  2023          INTERPRETATION:  CLINICAL INFORMATION: Left pelvic pain. History of   polycystic ovarian syndrome.    LMP: 2023    COMPARISON: None available.    TECHNIQUE:  Endovaginal and transabdominal pelvic sonogram. Color and Spectral   Doppler was performed.    FINDINGS:  Uterus: 3.3 x 1.8 x 2.5 cm. Within normal limits.  Endometrium: 0.6 cm. Within normal limits.    Right ovary: 3.3 x 1.8 x 2.5 cm. Within normal limits.  Left ovary: 7.9 x 4.6 x 7.0 cm (934 cc), inclusive of simple appearing   cyst 6.4 x 4.3 x 5.8 cm. Normal arterial and venous waveforms.    Fluid: None.    IMPRESSION:  Large left ovarian cyst. No evidence of torsion at this time. Correlate   clinically for intermittent torsion.        --- End of Report ---            TRINITY CARDONA MD; Attending Radiologist  This document has been electronically signed. 2023  1:50PM     28yo G0 LMP 10/30 presenting as transfer from [facility] for r/o ovarian torsion. The patient initially presenting to urgent care after sudden onset of LLQ pain. She reports pain as 10/10 at that time and was instructed to go to ED. She presented to [facility], where CT A/P demonstrated 8cm abdominal cyst. She received Morphine for analgesia and TVUS confirmed presence of cyst. CBC demonstrated elevated WBC count and labs were otherwise wnl. She was then transferred to [facility] ED for further evaluation. At [facility], the patient reports 7/10 pain after receiving Toradol. She states that the pain is constant, sharp, crampy, and radiates to lower back. Reports that pressure on LLQ does not increase pain. She denies nausea, vomiting, CP, SOB, diarrhea, constipation, HA, lightheadedness, syncope, fever, chills, or dysuria.     OB/GYN Hx: G0; denies hx of fibroids, stds, abnormal pap smears. In monogamous relationship with fiance. Previously on OCPs, d/c'd 1.5yrs ago. Using withdrawal method.   PMHx: PCOS  SHx: Denies  Meds: Spironolactone 150mg qd (for acne)  Allergies: Strawberries; NKDA    PHYSICAL EXAM:   Vital Signs Last 24 Hrs  T(C): 37.2 (2023 21:56), Max: 38 (2023 18:02)  T(F): 98.9 (2023 21:56), Max: 100.4 (2023 18:02)  HR: 100 (2023 21:56) (76 - 108)  BP: 123/84 (2023 21:56) (109/75 - 140/78)  RR: 20 (2023 21:56) (15 - 20)  SpO2: 97% (2023 21:56) (97% - 99%)    Parameters below as of 2023 21:56  Patient On (Oxygen Delivery Method): room air    **************************  Constitutional: NAD  Respiratory: breathing well on RA  Cardiovascular: RRR  Gastrointestinal: soft, non tender, positive bowel sounds, no rebound or guarding   Pelvic exam: SSE completed. Cervix appears closed, no discharge, 5cc dark brown blood in posterior vaginal vault. No CMT or adnexal tenderness.   Extremities: no calf tenderness or swelling    LABS:                        12.8   14.27 )-----------( 348      ( 2023 12:31 )             37.1         134  |  100  |  11  ----------------------------<  113<H>  3.8   |  24  |  0.69    Ca    9.5      2023 12:31    TPro  7.9  /  Alb  4.2  /  TBili  0.4  /  DBili  x   /  AST  18  /  ALT  14  /  AlkPhos  53      PT/INR - ( 2023 20:29 )   PT: 12.8 sec;   INR: 1.13          PTT - ( 2023 20:29 )  PTT:29.1 sec  Urinalysis Basic - ( 2023 12:31 )    Color: Yellow / Appearance: Clear / S.022 / pH: x  Gluc: 113 mg/dL / Ketone: Negative mg/dL  / Bili: Negative / Urobili: 1.0 mg/dL   Blood: x / Protein: Negative mg/dL / Nitrite: Negative   Leuk Esterase: Trace / RBC: 0 /HPF / WBC 1 /HPF   Sq Epi: x / Non Sq Epi: x / Bacteria: Present /HPF      HCG Quantitative, Serum: <1 mIU/mL ( @ 12:31)      RADIOLOGY & ADDITIONAL STUDIES:     ACC: [id number] EXAM:  CT ABDOMEN AND PELVIS IC   ORDERED BY: NIKUNJ PIMENTEL     PROCEDURE DATE:  2023          INTERPRETATION:  CLINICAL INFORMATION: Abdominal pain    COMPARISON: Pelvic ultrasound 2023    CONTRAST/COMPLICATIONS:  IV Contrast: Omnipaque 350  96 cc administered   4 cc discarded  Oral Contrast: NONE  Complications: None reported at time of study completion    PROCEDURE:  CT of the Abdomen and Pelvis was performed.  Sagittal and coronal reformats were performed.    FINDINGS:  LOWER CHEST: Within normal limits.    LIVER: Within normal limits.  BILE DUCTS: Normal caliber.  GALLBLADDER: Within normal limits.  SPLEEN: Within normal limits.  PANCREAS: Within normal limits.  ADRENALS: Within normal limits.  KIDNEYS/URETERS: Within normal limits.    BLADDER: Within normal limits.  REPRODUCTIVE ORGANS: 8 cm left ovarian cyst.    BOWEL: No bowel obstruction. The entire length of the appendix is not   identified with certainty but visualized portion of the appendix is   within normal limits.  PERITONEUM: No ascites.  VESSELS: Within normal limits.  RETROPERITONEUM/LYMPH NODES: No lymphadenopathy.  ABDOMINAL WALL: Within normal limits.  BONES: Within normal limits.    IMPRESSION:  No evidence of appendicitis. No bowel obstruction or free air. 8 cm left   ovarian cyst.        --- End of Report ---          VERONICA MANDUJANO MD; Resident Radiologist  This document has been electronically signed.  MATHEUS DEAN MD; Attending Radiologist  This document has been electronically signed. 2023  3:32PM      ACC: [id number] EXAM:  US TRANSVAGINAL   ORDERED BY: NIKUNJ PIMENTEL     ACC: [id number] EXAM:  US PELVIC COMPLETE   ORDERED BY: NIKUNJ PIMENTEL     PROCEDURE DATE:  2023          INTERPRETATION:  CLINICAL INFORMATION: Left pelvic pain. History of   polycystic ovarian syndrome.    LMP: 2023    COMPARISON: None available.    TECHNIQUE:  Endovaginal and transabdominal pelvic sonogram. Color and Spectral   Doppler was performed.    FINDINGS:  Uterus: 3.3 x 1.8 x 2.5 cm. Within normal limits.  Endometrium: 0.6 cm. Within normal limits.    Right ovary: 3.3 x 1.8 x 2.5 cm. Within normal limits.  Left ovary: 7.9 x 4.6 x 7.0 cm (934 cc), inclusive of simple appearing   cyst 6.4 x 4.3 x 5.8 cm. Normal arterial and venous waveforms.    Fluid: None.    IMPRESSION:  Large left ovarian cyst. No evidence of torsion at this time. Correlate   clinically for intermittent torsion.        --- End of Report ---            TRINITY CARDONA MD; Attending Radiologist  This document has been electronically signed. 2023  1:50PM

## 2023-11-28 NOTE — ED PROVIDER NOTE - CLINICAL SUMMARY MEDICAL DECISION MAKING FREE TEXT BOX
26 y/o female w/ hx PCOS p/w sudden constant L pelvic/ LLQ pain since this morning.  Denies f/c, n/v/d, dysuria, hematuria, vaginal bleeding/discharge.  Seen at [facility], found to have large L ovarian cyst on CTAP and US, transferred here for eval by OB/GYN.   Mild ttp on LLQ  Plan for OB/GYN consult  --  Seen by OBGYN -rec'd additional labwork, cxr, RVP as found to be febrile (100.4) on vitals  Pending final OB/GYN dispo/ plan

## 2023-11-28 NOTE — ED PROVIDER NOTE - PROGRESS NOTE DETAILS
[place]: other labs grossly wnl. CXR wnl. RVP neg. GYN recs pending. [place]: Vitals improving. Pending GYN recs. amos / joaquin. received on sign out pending GYN recs.  gyn cleared for dc. vitals improved - HR 100bpm. afebrile. pain controlled. rpt abd exam benign. tolerating po. ok for dc and outpt follow up.    All results reviewed with the patient verbally. Discharge plan and return precautions d/w pt who verbalized understanding and agrees with plan. All questions answered. Vitals WNL. Ready for d/c.

## 2023-11-28 NOTE — ED PROVIDER NOTE - ATTENDING APP SHARED VISIT CONTRIBUTION OF CARE
pt presented for L sided pelvic pain, hx of PCOS. concern for big ovarian cyst and persistent pain. Transferred for OBGYN eval and consider intermitent torsion.

## 2023-11-28 NOTE — ED PROVIDER NOTE - NS ED ROS FT
CONSTITUTIONAL: Denies fever and chills    RESPIRATORY: Denies SOB    CARDIOVASCULAR: Denies chest pain.    GASTROINTESTINAL: +abdominal pain.  Denies nausea, vomiting and diarrhea.    GENITOURINARY: Denies dysuria and hematuria.

## 2023-11-28 NOTE — ED PROVIDER NOTE - OBJECTIVE STATEMENT
28 y/o female w/ hx PCOS p/w sudden constant L pelvic/ LLQ pain since this morning.  Denies f/c, n/v/d, dysuria, hematuria, vaginal bleeding/discharge.  Seen at [facility], found to have large L ovarian cyst on CTAP and US, transferred here for eval by OB/GYN.

## 2023-11-28 NOTE — ED ADULT NURSE NOTE - NSFALLUNIVINTERV_ED_ALL_ED
Bed/Stretcher in lowest position, wheels locked, appropriate side rails in place/Call bell, personal items and telephone in reach/Instruct patient to call for assistance before getting out of bed/chair/stretcher/Non-slip footwear applied when patient is off stretcher/[place] to call system/Physically safe environment - no spills, clutter or unnecessary equipment/Purposeful proactive rounding/Room/bathroom lighting operational, light cord in reach

## 2023-11-28 NOTE — ED PROVIDER NOTE - ATTENDING APP SHARED VISIT CONTRIBUTION OF CARE
Transferred from [facility] for OB after labs/CT/US.   Borderline tachycardia, 100.4 oral temp, other vitals wnl. Exam as above.   GYN requested additional labs/CXR, currently at bedside. Will reassess.

## 2023-11-28 NOTE — CONSULT NOTE ADULT - ASSESSMENT
28yo G0 presenting as transfer from [facility] for r/o ovarian torsion and diagnosis of a large left ovarian cyst. Clinically, patient does not appear to be torsed. Although cannot rule out intermittent ovarian torsion, pt is clinically and hemodynamically stable, and has a benign abdominal exam. Of note, she was febrile to 100.4 upon arrival to [facility] ED. Fever workup was completed and negative at this time. Low concern for PID at this time given simple appearing cyst and normal CRP. Cannot rule out hemorrhagic cyst at this time, given onset of menses.   -VSS, mildly tachycardic. Febrile to 100.4 at 18:02, resolved with Toradol.   -Fever workup completed. Elevated WBC to 14.3, CBC otherwise wnl. CMP wnl. Lactate 2.1. UA +trace LE, UCx and BCx sent. Covid/RVP neg. ESR and CRP wnl.   -Pt received Morphine at [facility] and Toradol at [facility] ED. Pt reports improvement of pain with Toradol from 10/10 to 6-7/10.   -Physical exam wnl. Scant dark blood c/w onset of menses seen on SSE. Benign abdominal exam.   -CXR wet read wnl. Will f/u final read.   -Pt to follow up with Dr. Prather tomorrow regarding possible surgical planning for removal of cyst. Recommend restarting OCPs.   -No acute gyn interventions at this time.     Marco Brock PGY2  D/w Dr. Prather and Dr. Gilmore PGY4 28yo G0 presenting as transfer from [facility] for r/o ovarian torsion and diagnosis of a large left ovarian cyst. Clinically, patient does not appear to be torsed. Although cannot rule out intermittent ovarian torsion, pt is clinically and hemodynamically stable, and has a benign abdominal exam. Of note, she was febrile to 100.4 upon arrival to [facility] ED. Fever workup was completed and negative at this time. Low concern for PID at this time given simple appearing cyst and normal CRP. Cannot rule out hemorrhagic cyst at this time, given onset of menses.   -VSS, mildly tachycardic. Febrile to 100.4 at 18:02, resolved with Toradol.   -Fever workup completed. Elevated WBC to 14.3, CBC otherwise wnl. CMP wnl. Lactate 2.1. UA +trace LE, UCx and BCx sent. Covid/RVP neg. ESR and CRP wnl.   -Pt received Morphine at [facility] and Toradol at [facility] ED. Pt reports improvement of pain with Toradol from 10/10 to 6-7/10.   -Physical exam wnl. Scant dark blood c/w onset of menses seen on SSE. Benign abdominal exam.   -CXR wet read wnl. Will f/u final read.   -Pt to follow up with Dr. Prather tomorrow regarding possible surgical planning for removal of cyst. Recommend restarting OCPs.   -No acute gyn interventions at this time.     Marco Brock PGY2  D/w Dr. Prather and Dr. Gilmore PGY4 28yo G0 presenting as transfer from [facility] for r/o ovarian torsion and diagnosis of a large left ovarian cyst. Clinically, patient does not appear to be torsed. Although cannot rule out intermittent ovarian torsion, pt is clinically and hemodynamically stable, and has a benign abdominal exam. Of note, she was febrile to 100.4 upon arrival to [facility] ED. Fever workup was completed and negative at this time. Low concern for PID at this time given simple appearing cyst and normal CRP. Cannot rule out hemorrhagic cyst at this time, given onset of menses.   -VSS, mildly tachycardic. Febrile to 100.4 at 18:02, resolved with Toradol.   -Fever workup completed. Elevated WBC to 14.3, CBC otherwise wnl. CMP wnl. Lactate 2.1. UA +trace LE, UCx and BCx sent. Covid/RVP neg. ESR and CRP wnl.   -Pt received Morphine at [facility] and Toradol at [facility] ED. Pt reports improvement of pain with Toradol from 10/10 to 6-7/10.   -Physical exam wnl. Scant dark blood c/w onset of menses seen on SSE. Benign abdominal exam.   -CXR wet read wnl. Will f/u final read.   -Pt to follow up with Dr. Prather tomorrow regarding possible surgical planning for removal of cyst. Recommend restarting OCPs.   -No acute gyn interventions at this time.     Marco Brock PGY2  D/w Dr. Prather and Dr. Gilmore PGY4

## 2023-11-28 NOTE — ED PROVIDER NOTE - PATIENT PORTAL LINK FT
You can access the FollowMyHealth Patient Portal offered by [facility] by registering at the following website: [email or web address]. By joining [employer]’s FollowMyHealth portal, you will also be able to view your health information using other applications (apps) compatible with our system.

## 2023-11-30 LAB
CULTURE RESULTS: SIGNIFICANT CHANGE UP
CULTURE RESULTS: SIGNIFICANT CHANGE UP
SPECIMEN SOURCE: SIGNIFICANT CHANGE UP
SPECIMEN SOURCE: SIGNIFICANT CHANGE UP

## 2023-12-01 DIAGNOSIS — Z91.018 ALLERGY TO OTHER FOODS: ICD-10-CM

## 2023-12-01 DIAGNOSIS — Z87.42 PERSONAL HISTORY OF OTHER DISEASES OF THE FEMALE GENITAL TRACT: ICD-10-CM

## 2023-12-01 DIAGNOSIS — R00.0 TACHYCARDIA, UNSPECIFIED: ICD-10-CM

## 2023-12-01 DIAGNOSIS — D72.829 ELEVATED WHITE BLOOD CELL COUNT, UNSPECIFIED: ICD-10-CM

## 2023-12-01 DIAGNOSIS — Z20.822 CONTACT WITH AND (SUSPECTED) EXPOSURE TO COVID-19: ICD-10-CM

## 2023-12-01 DIAGNOSIS — N83.202 UNSPECIFIED OVARIAN CYST, LEFT SIDE: ICD-10-CM

## 2023-12-01 DIAGNOSIS — R10.2 PELVIC AND PERINEAL PAIN: ICD-10-CM

## 2023-12-03 LAB
CULTURE RESULTS: SIGNIFICANT CHANGE UP
SPECIMEN SOURCE: SIGNIFICANT CHANGE UP